# Patient Record
Sex: FEMALE | Race: WHITE | NOT HISPANIC OR LATINO | Employment: FULL TIME | URBAN - METROPOLITAN AREA
[De-identification: names, ages, dates, MRNs, and addresses within clinical notes are randomized per-mention and may not be internally consistent; named-entity substitution may affect disease eponyms.]

---

## 2019-12-30 ENCOUNTER — TRANSCRIBE ORDERS (OUTPATIENT)
Dept: ADMINISTRATIVE | Facility: HOSPITAL | Age: 44
End: 2019-12-30

## 2019-12-30 DIAGNOSIS — Z12.31 SCREENING MAMMOGRAM, ENCOUNTER FOR: Primary | ICD-10-CM

## 2021-04-02 ENCOUNTER — BULK ORDERING (OUTPATIENT)
Dept: CASE MANAGEMENT | Facility: OTHER | Age: 46
End: 2021-04-02

## 2021-04-02 DIAGNOSIS — Z23 IMMUNIZATION DUE: ICD-10-CM

## 2023-03-31 ENCOUNTER — OFFICE VISIT (OUTPATIENT)
Dept: CARDIOLOGY | Facility: CLINIC | Age: 48
End: 2023-03-31
Payer: COMMERCIAL

## 2023-03-31 VITALS
BODY MASS INDEX: 26.4 KG/M2 | HEIGHT: 63 IN | SYSTOLIC BLOOD PRESSURE: 130 MMHG | DIASTOLIC BLOOD PRESSURE: 76 MMHG | WEIGHT: 149 LBS | HEART RATE: 82 BPM

## 2023-03-31 DIAGNOSIS — R07.89 OTHER CHEST PAIN: ICD-10-CM

## 2023-03-31 DIAGNOSIS — R06.02 SOB (SHORTNESS OF BREATH): ICD-10-CM

## 2023-03-31 DIAGNOSIS — R00.2 PALPITATIONS: Primary | ICD-10-CM

## 2023-03-31 PROCEDURE — 99204 OFFICE O/P NEW MOD 45 MIN: CPT | Performed by: INTERNAL MEDICINE

## 2023-03-31 PROCEDURE — 93000 ELECTROCARDIOGRAM COMPLETE: CPT | Performed by: INTERNAL MEDICINE

## 2023-03-31 RX ORDER — TRAZODONE HYDROCHLORIDE 100 MG/1
100 TABLET ORAL
COMMUNITY
Start: 2023-03-16

## 2023-03-31 RX ORDER — HYDROCORTISONE ACETATE 25 MG
SUPPOSITORY, RECTAL RECTAL
COMMUNITY
Start: 2023-03-17

## 2023-03-31 RX ORDER — BUSPIRONE HYDROCHLORIDE 15 MG/1
TABLET ORAL
COMMUNITY
Start: 2023-01-23

## 2023-03-31 RX ORDER — VILAZODONE HYDROCHLORIDE 20 MG/1
1 TABLET ORAL DAILY
COMMUNITY
Start: 2023-03-22

## 2023-03-31 NOTE — PROGRESS NOTES
Subjective:     Encounter Date:03/31/23      Patient ID: Kailyn Bustamante is a 47 y.o. female.    Chief Complaint:  History of Present Illness    Dear Dr. Tejeda,    I had the pleasure of seeing this patient in the office today for initial evaluation and consultation.  I appreciate that you sent her in to see us.  They come in today to be seen for relation of palpitations.    Patient has been having episodes of palpitations.  She is also having episodes of chest discomfort when she has the palpitations.  Symptoms are not particular associated with activity or anything else.  When she gets the chest discomfort she also gets some upper arm discomfort.  She has been having these episodes maybe for 3 years, no orthopnea or PND.  She denies any lower extremity edema.  Sometimes she feels a little bit of shortness of breath when she is up and doing things as well as when she has the heart racing..  She is also noted that she seems to fatigue more easily than she used to.  She does have some spells of dizziness at times, no presyncope or syncope.    This patient has no known cardiac history.  This patient has no history of coronary artery disease, congestive heart failure, rheumatic fever, rheumatic heart disease, congenital heart disease or heart murmur.  This patient has never required invasive cardiovascular evaluation.    The following portions of the patient's history were reviewed and updated as appropriate: allergies, current medications, past family history, past medical history, past social history, past surgical history and problem list.      ECG 12 Lead    Date/Time: 3/31/2023 10:29 AM  Performed by: Luis Raya III, MD  Authorized by: Luis Raya III, MD   Comparison: compared with previous ECG   Similar to previous ECG  Rhythm: sinus rhythm  Rate: normal  Conduction: conduction normal  ST Segments: ST segments normal  T Waves: T waves normal  QRS axis: normal  Other: no other findings    Clinical  "impression: normal ECG               Objective:     Vitals:    03/31/23 0856   BP: 130/76   Pulse: 82   Weight: 67.6 kg (149 lb)   Height: 160 cm (63\")     Body mass index is 26.39 kg/m².      Vitals reviewed.   Constitutional:       General: Not in acute distress.     Appearance: Well-developed. Not diaphoretic.   Eyes:      General:         Right eye: No discharge.         Left eye: No discharge.      Conjunctiva/sclera: Conjunctivae normal.      Pupils: Pupils are equal, round, and reactive to light.   HENT:      Head: Normocephalic and atraumatic.      Nose: Nose normal.   Neck:      Thyroid: No thyromegaly.      Trachea: No tracheal deviation.      Lymphadenopathy: No cervical adenopathy.   Pulmonary:      Effort: Pulmonary effort is normal. No respiratory distress.      Breath sounds: Normal breath sounds. No stridor.   Chest:      Chest wall: Not tender to palpatation.   Cardiovascular:      Normal rate. Regular rhythm.      Murmurs: There is no murmur.      . No S3 gallop. No click. No rub.   Pulses:     Intact distal pulses.   Edema:     Peripheral edema absent.   Abdominal:      General: Bowel sounds are normal. There is no distension.      Palpations: Abdomen is soft. There is no abdominal mass.      Tenderness: There is no abdominal tenderness. There is no guarding or rebound.   Musculoskeletal: Normal range of motion.         General: No tenderness or deformity.      Cervical back: Normal range of motion and neck supple. Skin:     General: Skin is warm and dry.      Findings: No erythema or rash.   Neurological:      Mental Status: Alert and oriented to person, place, and time.      Deep Tendon Reflexes: Reflexes are normal and symmetric.   Psychiatric:         Thought Content: Thought content normal.         Data and records reviewed:     Lab Results   Component Value Date    GLUCOSE 91 01/27/2016    BUN 13 01/27/2016    CREATININE 1.13 (H) 01/27/2016    BCR 12 01/27/2016    K 4.3 01/27/2016    CO2 23 " 01/27/2016    CALCIUM 9.0 01/27/2016    PROTENTOTREF 6.5 01/27/2016    ALBUMIN 4.2 01/27/2016    BILITOT 0.3 01/27/2016    AST 17 01/27/2016    ALT 12 01/27/2016     No results found for: CHOL  Lab Results   Component Value Date    TRIG 94 01/27/2016     Lab Results   Component Value Date    HDL 71 (H) 01/27/2016     Lab Results   Component Value Date    LDL 63 01/27/2016     Lab Results   Component Value Date    VLDL 19 01/27/2016     No results found for: LDLHDL    No radiology results for the last 90 days.          Assessment:          Diagnosis Plan   1. Palpitations  Adult Transthoracic Echo Complete W/ Cont if Necessary Per Protocol    Holter Monitor - 24 Hour    Treadmill Stress Test    ECG 12 Lead      2. Other chest pain  Adult Transthoracic Echo Complete W/ Cont if Necessary Per Protocol    Holter Monitor - 24 Hour    Treadmill Stress Test    ECG 12 Lead      3. SOB (shortness of breath)  Adult Transthoracic Echo Complete W/ Cont if Necessary Per Protocol    Holter Monitor - 24 Hour    Treadmill Stress Test    ECG 12 Lead             Plan:       1.  Palpitations-we will place a Holter monitor  2.  Chest discomfort and shortness of breath, will arrange for an EKG stress test as well as an echocardiogram.  Performed.  Further evaluation treatment predicated on the results.    Thank you very much for allowing us to participate in the care of this pleasant patient.  Please don't hesitate to call if I can be of assistance in any way.      Current Outpatient Medications:   •  amphetamine-dextroamphetamine (ADDERALL) 30 MG tablet, Take 1 tablet by mouth 3 (Three) Times a Day., Disp: 90 tablet, Rfl: 0  •  Anucort-HC 25 MG suppository, , Disp: , Rfl:   •  ARIPiprazole (ABILIFY) 30 MG tablet, Take 1 tablet by mouth Daily., Disp: , Rfl:   •  busPIRone (BUSPAR) 15 MG tablet, , Disp: , Rfl:   •  Levonorgestrel (MIRENA) 20 MCG/DAY intrauterine device IUD, 1 each by Intrauterine route., Disp: , Rfl:   •  topiramate  (TOPAMAX) 50 MG tablet, TAKE ONE TABLET BY MOUTH TWICE DAILY, Disp: 60 tablet, Rfl: 5  •  traZODone (DESYREL) 100 MG tablet, Take 1 tablet by mouth every night at bedtime., Disp: , Rfl:   •  vilazodone (VIIBRYD) 20 MG tablet tablet, Take 1 tablet by mouth Daily., Disp: , Rfl:   •  Cholecalciferol (VITAMIN D3) 35306 UNITS capsule, TAKE ONE CAPSULE WEEKLY (Patient not taking: Reported on 3/31/2023), Disp: , Rfl: 5  •  Cholecalciferol (VITAMIN D3) 78568 UNITS capsule, TAKE ONE CAPSULE WEEKLY (Patient not taking: Reported on 3/31/2023), Disp: 5 capsule, Rfl: 11  •  montelukast (SINGULAIR) 10 MG tablet, TAKE ONE TABLET BY MOUTH DAILY (Patient not taking: Reported on 3/31/2023), Disp: 30 tablet, Rfl: 5  •  venlafaxine XR (EFFEXOR-XR) 150 MG 24 hr capsule, 2 po Q day (Patient not taking: Reported on 3/31/2023), Disp: 180 capsule, Rfl: 0         No follow-ups on file.

## 2025-03-17 ENCOUNTER — OFFICE VISIT (OUTPATIENT)
Age: 50
End: 2025-03-17
Payer: COMMERCIAL

## 2025-03-17 VITALS
BODY MASS INDEX: 24.07 KG/M2 | DIASTOLIC BLOOD PRESSURE: 70 MMHG | WEIGHT: 141 LBS | TEMPERATURE: 98 F | SYSTOLIC BLOOD PRESSURE: 124 MMHG | RESPIRATION RATE: 14 BRPM | HEIGHT: 64 IN | HEART RATE: 74 BPM | OXYGEN SATURATION: 98 %

## 2025-03-17 DIAGNOSIS — E55.9 VITAMIN D DEFICIENCY: ICD-10-CM

## 2025-03-17 DIAGNOSIS — Z13.1 SCREENING FOR DIABETES MELLITUS: ICD-10-CM

## 2025-03-17 DIAGNOSIS — Z13.220 SCREENING CHOLESTEROL LEVEL: ICD-10-CM

## 2025-03-17 DIAGNOSIS — Z12.11 ENCOUNTER FOR SCREENING COLONOSCOPY: ICD-10-CM

## 2025-03-17 DIAGNOSIS — Z00.00 ENCOUNTER FOR ROUTINE ADULT HEALTH EXAMINATION WITHOUT ABNORMAL FINDINGS: Primary | ICD-10-CM

## 2025-03-17 DIAGNOSIS — Z12.11 ENCOUNTER FOR SCREENING FOR MALIGNANT NEOPLASM OF COLON: ICD-10-CM

## 2025-03-17 DIAGNOSIS — F33.42 RECURRENT MAJOR DEPRESSIVE DISORDER, IN FULL REMISSION: ICD-10-CM

## 2025-03-17 PROCEDURE — 99204 OFFICE O/P NEW MOD 45 MIN: CPT | Performed by: FAMILY MEDICINE

## 2025-03-17 RX ORDER — AMPHETAMINE 18.8 MG/1
18.8 TABLET, ORALLY DISINTEGRATING ORAL DAILY
COMMUNITY
Start: 2024-04-19

## 2025-03-17 RX ORDER — DEXTROMETHORPHAN HYDROBROMIDE, BUPROPION HYDROCHLORIDE 105; 45 MG/1; MG/1
1 TABLET, MULTILAYER, EXTENDED RELEASE ORAL 2 TIMES DAILY
COMMUNITY

## 2025-03-17 RX ORDER — LAMOTRIGINE 100 MG/1
100 TABLET ORAL DAILY
COMMUNITY

## 2025-03-17 NOTE — PROGRESS NOTES
"Chief Complaint  Annual Exam    Subjective        Kailyn Bustamante presents to [facility]  History of Present Illness    History of Present Illness  The patient presents for a physical exam.    She is currently on a regimen of venlafaxine, taking two 150 mg tablets daily, along with trazodone for sleep, Abilify, and lamotrigine 100 mg once at night. She also takes Auvrelity 45 mg twice daily. Her depression has been well-managed for some time now. She underwent ketamine treatments once weekly for a duration of 6 weeks, which she reports as beneficial. She attempted to discontinue all medications, but this was unsuccessful.    She experiences heartburn approximately every 2 weeks, which can disrupt her sleep. She has identified pasta sauce as a trigger and attempts to avoid it.    She had a mammogram a year ago and is getting them every 6 months. She also had an MRI because her mother and sister both had breast cancer. Her mother tested negative for the BRCA gene.    She reports joint pain, but it is not severe enough to warrant medication. She engages in regular exercise but acknowledges the need for increased physical activity. She works approximately 30 hours per week.    SOCIAL HISTORY  She works as an  for [employer].    FAMILY HISTORY  Her mother and sister both had breast cancer. Her mother tested negative for the BRCA gene.    MEDICATIONS  venlafaxine, trazodone, Abilify, lamotrigine, Auvrelity      Objective   Vital Signs:  /70 (BP Location: Right arm, Patient Position: Sitting, Cuff Size: Adult)   Pulse 74   Temp 98 °F (36.7 °C) (Temporal)   Resp 14   Ht 162.6 cm (64\")   Wt 64 kg (141 lb)   SpO2 98%   BMI 24.20 kg/m²   Estimated body mass index is 24.2 kg/m² as calculated from the following:    Height as of this encounter: 162.6 cm (64\").    Weight as of this encounter: 64 kg (141 lb).    BMI is within normal parameters. No other follow-up " for BMI required.        Physical Exam  Constitutional:       General: She is not in acute distress.     Appearance: Normal appearance. She is not ill-appearing, toxic-appearing or diaphoretic.   HENT:      Head: Normocephalic and atraumatic.      Right Ear: There is no impacted cerumen.      Left Ear: There is no impacted cerumen.      Nose: No congestion or rhinorrhea.      Mouth/Throat:      Pharynx: Oropharynx is clear. No oropharyngeal exudate or posterior oropharyngeal erythema.   Eyes:      General: No scleral icterus.        Right eye: No discharge.         Left eye: No discharge.      Extraocular Movements: Extraocular movements intact.      Conjunctiva/sclera: Conjunctivae normal.      Pupils: Pupils are equal, round, and reactive to light.   Cardiovascular:      Rate and Rhythm: Normal rate and regular rhythm.      Pulses: Normal pulses.      Heart sounds: Normal heart sounds.   Pulmonary:      Effort: Pulmonary effort is normal.      Breath sounds: Normal breath sounds.   Abdominal:      General: Abdomen is flat. Bowel sounds are normal.      Palpations: Abdomen is soft.   Musculoskeletal:         General: Normal range of motion.      Cervical back: Normal range of motion and neck supple.   Skin:     General: Skin is warm.   Neurological:      General: No focal deficit present.      Mental Status: She is alert and oriented to person, place, and time. Mental status is at baseline.   Psychiatric:         Mood and Affect: Mood normal.         Behavior: Behavior normal.         Thought Content: Thought content normal.         Judgment: Judgment normal.             Lungs were auscultated.         Result Review :           Results               Patient Counseling:  --Nutrition: Stressed importance of moderation in sodium/caffeine intake, saturated fat and cholesterol, caloric balance, sufficient intake of fresh fruits, vegetables, fiber, calcium, iron, and 1 mg of folate supplement per day (for females capable  of pregnancy).  --Exercise: Stressed the importance of regular exercise.   --Substance Abuse: Discussed cessation/primary prevention of tobacco, alcohol, or other drug use; driving or other dangerous activities under the influence; availability of treatment for abuse.    --Sexuality: Discussed sexually transmitted diseases, partner selection, use of condoms, avoidance of unintended pregnancy  and contraceptive alternatives.   --Injury prevention: Discussed safety belts, safety helmets, smoke detector, smoking near bedding or upholstery.   --Dental health: Discussed importance of regular tooth brushing, flossing, and dental visits.  --Immunizations reviewed.  --Discussed benefits of screening colonoscopy.  --After hours service discussed with patient       Assessment and Plan   Diagnoses and all orders for this visit:    1. Encounter for routine adult health examination without abnormal findings (Primary)    2. Encounter for screening for malignant neoplasm of colon  -     Ambulatory Referral For Screening Colonoscopy    3. Encounter for screening colonoscopy    4. Screening cholesterol level  -     Hepatic Function Panel (6) ([facility])  -     Lipid Panel    5. Screening for diabetes mellitus  -     Basic Metabolic Panel  -     CBC & Differential  -     Hemoglobin A1c    6. Recurrent major depressive disorder, in full remission    7. Vitamin D deficiency  -     Vitamin D 25 hydroxy; Future        Assessment & Plan  1. Depression.  She reports that her depression has been stable for a while. She is currently taking venlafaxine 300 mg daily, trazodone for sleep, Abilify, lamotrigine 100 mg once at night, and Auvrelity 45 mg twice a day. She also underwent ketamine treatments for 6 weeks, which she found beneficial.    2. Heartburn.  She experiences heartburn approximately every 2 weeks, often triggered by pasta sauce. It sometimes wakes her up from sleep.    3. Health Maintenance.  She had a mammogram a year ago and is  getting them every 6 months due to her family history of breast cancer. Her mother tested negative for the BRCA gene.    4. Joint pain.            Follow Up   No follow-ups on file.  Patient was given instructions and counseling regarding her condition or for health maintenance advice. Please see specific information pulled into the AVS if appropriate.         Patient or patient representative verbalized consent for the use of Ambient Listening during the visit with  Parminder Tejeda MD for chart documentation. 3/17/2025  11:11 EDT

## 2025-03-18 LAB
ALBUMIN SERPL-MCNC: 4.2 G/DL (ref 3.8–4.9)
ALP SERPL-CCNC: 88 IU/L (ref 44–121)
ALT SERPL-CCNC: 16 IU/L (ref 0–32)
AST SERPL-CCNC: 23 IU/L (ref 0–40)
BASOPHILS # BLD AUTO: 0.1 X10E3/UL (ref 0–0.2)
BASOPHILS NFR BLD AUTO: 1 %
BILIRUB DIRECT SERPL-MCNC: 0.08 MG/DL (ref 0–0.4)
BILIRUB SERPL-MCNC: <0.2 MG/DL (ref 0–1.2)
BUN SERPL-MCNC: 15 MG/DL (ref 6–24)
BUN/CREAT SERPL: 14 (ref 9–23)
CALCIUM SERPL-MCNC: 9.1 MG/DL (ref 8.7–10.2)
CHLORIDE SERPL-SCNC: 103 MMOL/L (ref 96–106)
CHOLEST SERPL-MCNC: 178 MG/DL (ref 100–199)
CO2 SERPL-SCNC: 23 MMOL/L (ref 20–29)
CREAT SERPL-MCNC: 1.04 MG/DL (ref 0.57–1)
EGFRCR SERPLBLD CKD-EPI 2021: 65 ML/MIN/1.73
EOSINOPHIL # BLD AUTO: 0.1 X10E3/UL (ref 0–0.4)
EOSINOPHIL NFR BLD AUTO: 1 %
ERYTHROCYTE [DISTWIDTH] IN BLOOD BY AUTOMATED COUNT: 12.1 % (ref 11.7–15.4)
GLUCOSE SERPL-MCNC: 103 MG/DL (ref 70–99)
HBA1C MFR BLD: 5.5 % (ref 4.8–5.6)
HCT VFR BLD AUTO: 40.3 % (ref 34–46.6)
HDLC SERPL-MCNC: 76 MG/DL
HGB BLD-MCNC: 13.6 G/DL (ref 11.1–15.9)
IMM GRANULOCYTES # BLD AUTO: 0 X10E3/UL (ref 0–0.1)
IMM GRANULOCYTES NFR BLD AUTO: 0 %
LDLC SERPL CALC-MCNC: 79 MG/DL (ref 0–99)
LYMPHOCYTES # BLD AUTO: 1.5 X10E3/UL (ref 0.7–3.1)
LYMPHOCYTES NFR BLD AUTO: 29 %
MCH RBC QN AUTO: 31.2 PG (ref 26.6–33)
MCHC RBC AUTO-ENTMCNC: 33.7 G/DL (ref 31.5–35.7)
MCV RBC AUTO: 92 FL (ref 79–97)
MONOCYTES # BLD AUTO: 0.7 X10E3/UL (ref 0.1–0.9)
MONOCYTES NFR BLD AUTO: 13 %
NEUTROPHILS # BLD AUTO: 2.9 X10E3/UL (ref 1.4–7)
NEUTROPHILS NFR BLD AUTO: 56 %
PLATELET # BLD AUTO: 311 X10E3/UL (ref 150–450)
POTASSIUM SERPL-SCNC: 4.4 MMOL/L (ref 3.5–5.2)
RBC # BLD AUTO: 4.36 X10E6/UL (ref 3.77–5.28)
SODIUM SERPL-SCNC: 137 MMOL/L (ref 134–144)
TRIGL SERPL-MCNC: 137 MG/DL (ref 0–149)
VLDLC SERPL CALC-MCNC: 23 MG/DL (ref 5–40)
WBC # BLD AUTO: 5.2 X10E3/UL (ref 3.4–10.8)

## 2025-04-28 ENCOUNTER — TELEPHONE (OUTPATIENT)
Dept: GASTROENTEROLOGY | Facility: CLINIC | Age: 50
End: 2025-04-28
Payer: COMMERCIAL

## 2025-04-28 DIAGNOSIS — Z12.11 ENCOUNTER FOR SCREENING FOR MALIGNANT NEOPLASM OF COLON: Primary | ICD-10-CM

## 2025-04-28 NOTE — TELEPHONE ENCOUNTER
Screening colonoscopy    No personal hx polyps  No family hx polyps or cx    Asa or blood thinners:  Ibuprofen    List medications:  Effexor  Abilify  Auvelity  Adzenys  Lamictal  Trazodone    OA form scanned in media

## 2025-05-13 ENCOUNTER — TELEPHONE (OUTPATIENT)
Dept: GASTROENTEROLOGY | Facility: CLINIC | Age: 50
End: 2025-05-13
Payer: COMMERCIAL